# Patient Record
(demographics unavailable — no encounter records)

---

## 2023-09-18 RX ORDER — TRAZODONE HYDROCHLORIDE 50 MG/1
TABLET ORAL
COMMUNITY
Start: 2023-07-12 | End: 2023-09-18 | Stop reason: SDUPTHER

## 2023-09-18 RX ORDER — TRAZODONE HYDROCHLORIDE 50 MG/1
50 TABLET ORAL NIGHTLY
Qty: 60 TABLET | Refills: 0 | Status: SHIPPED | OUTPATIENT
Start: 2023-09-18 | End: 2023-11-17

## 2023-09-18 NOTE — TELEPHONE ENCOUNTER
Patient needs refill on  Trazodone 50mg  1 tab at night  [facility] [employer] [facility] [employer] their ph# [phone]   Pt will contact pharm

## 2023-11-17 RX ORDER — TRAZODONE HYDROCHLORIDE 50 MG/1
50 TABLET ORAL
Qty: 60 TABLET | Refills: 0 | Status: SHIPPED | OUTPATIENT
Start: 2023-11-17

## 2023-11-17 NOTE — TELEPHONE ENCOUNTER
Argentina Jade is calling to request a refill on the following medication(s):    Medication Request:  Requested Prescriptions     Pending Prescriptions Disp Refills    traZODone (DESYREL) 50 MG tablet [Pharmacy Med Name: traZODone HCl Oral Tablet 50 MG] 60 tablet 0     Sig: TAKE ONE TABLET BY MOUTH NIGHTLY       Last Visit Date (If Applicable):  Visit date not found    Next Visit Date:    Visit date not found